# Patient Record
Sex: FEMALE | URBAN - METROPOLITAN AREA
[De-identification: names, ages, dates, MRNs, and addresses within clinical notes are randomized per-mention and may not be internally consistent; named-entity substitution may affect disease eponyms.]

---

## 2020-09-17 ENCOUNTER — OFFICE VISIT (OUTPATIENT)
Dept: URBAN - METROPOLITAN AREA CLINIC 76 | Facility: CLINIC | Age: 60
End: 2020-09-17
Payer: COMMERCIAL

## 2020-09-17 DIAGNOSIS — H52.4 PRESBYOPIA: Primary | ICD-10-CM

## 2020-09-17 PROCEDURE — 92004 COMPRE OPH EXAM NEW PT 1/>: CPT | Performed by: OPTOMETRIST

## 2020-09-17 ASSESSMENT — INTRAOCULAR PRESSURE
OD: 14
OS: 14

## 2020-09-17 ASSESSMENT — KERATOMETRY
OS: 45.00
OD: 45.25

## 2020-09-17 ASSESSMENT — VISUAL ACUITY
OD: 20/60
OS: 20/30

## 2020-09-17 NOTE — IMPRESSION/PLAN
Impression: Age-related nuclear cataract, bilateral: H25.13. Bilateral. Plan: Cataracts account for the patient's complaints. Patient understands changing glasses will not improve vision. Recommend josafat padilla/ Dr. Hema Morton for cataract surgery.

## 2020-10-27 ENCOUNTER — PRE-OPERATIVE VISIT (OUTPATIENT)
Dept: URBAN - METROPOLITAN AREA CLINIC 76 | Facility: CLINIC | Age: 60
End: 2020-10-27
Payer: MEDICARE

## 2020-10-27 DIAGNOSIS — H25.13 AGE-RELATED NUCLEAR CATARACT, BILATERAL: Primary | ICD-10-CM

## 2020-10-27 PROCEDURE — 92136 OPHTHALMIC BIOMETRY: CPT | Performed by: OPHTHALMOLOGY

## 2020-10-27 RX ORDER — DICLOFENAC SODIUM 1 MG/ML
0.1 % SOLUTION/ DROPS OPHTHALMIC
Qty: 2 | Refills: 1 | Status: INACTIVE
Start: 2020-10-27 | End: 2020-10-27

## 2020-10-27 RX ORDER — CIPROFLOXACIN HYDROCHLORIDE 3.5 MG/ML
0.3 % SOLUTION/ DROPS TOPICAL
Qty: 1 | Refills: 1 | Status: INACTIVE
Start: 2020-10-27 | End: 2020-11-18

## 2020-10-27 RX ORDER — PREDNISOLONE ACETATE 10 MG/ML
1 % SUSPENSION/ DROPS OPHTHALMIC
Qty: 1 | Refills: 1 | Status: ACTIVE
Start: 2020-10-27

## 2020-10-27 RX ORDER — DICLOFENAC SODIUM 1 MG/ML
0.1 % SOLUTION/ DROPS OPHTHALMIC
Qty: 1 | Refills: 1 | Status: INACTIVE
Start: 2020-10-27 | End: 2020-12-03

## 2020-10-27 ASSESSMENT — PACHYMETRY
OS: 23.03
OS: 3.10
OD: 3.12
OD: 23.08

## 2020-10-30 ENCOUNTER — OFFICE VISIT (OUTPATIENT)
Dept: URBAN - METROPOLITAN AREA CLINIC 76 | Facility: CLINIC | Age: 60
End: 2020-10-30
Payer: MEDICARE

## 2020-10-30 PROCEDURE — 92002 INTRM OPH EXAM NEW PATIENT: CPT | Performed by: OPHTHALMOLOGY

## 2020-10-30 ASSESSMENT — KERATOMETRY
OD: 45.13
OS: 45.38

## 2020-10-30 ASSESSMENT — INTRAOCULAR PRESSURE
OS: 14
OD: 14

## 2020-10-30 ASSESSMENT — VISUAL ACUITY
OD: 20/60
OS: 20/40

## 2020-10-30 NOTE — IMPRESSION/PLAN
Impression: Age-related nuclear cataract, bilateral: H25.13. Bilateral. Plan: Discussed cataract diagnosis with the patient. Discussed risks, benefits and alternatives to surgery including but not limited to: bleeding, infection, risk of vision loss, loss of the eye, need for other surgery. Patient voiced understanding and wishes to proceed. Patient elects surgical treatment. Advanced technology discussed with patient. Patient desires surgery OU, OD first (( recommend DISTANCE -0.25 OU w/ STANDARD IOL, anesthesia block patient, use drops-start ocuflox 1 day prior to sx)) Patient understands the need for glasses after surgery for BCVA. Risk Level 2.

## 2020-11-05 ENCOUNTER — SURGERY (OUTPATIENT)
Dept: URBAN - METROPOLITAN AREA SURGERY 47 | Facility: SURGERY | Age: 60
End: 2020-11-05
Payer: MEDICARE

## 2020-11-05 PROCEDURE — 66984 XCAPSL CTRC RMVL W/O ECP: CPT | Performed by: OPHTHALMOLOGY

## 2020-11-06 ENCOUNTER — POST-OPERATIVE VISIT (OUTPATIENT)
Dept: URBAN - METROPOLITAN AREA CLINIC 76 | Facility: CLINIC | Age: 60
End: 2020-11-06
Payer: MEDICARE

## 2020-11-06 PROCEDURE — 99024 POSTOP FOLLOW-UP VISIT: CPT | Performed by: OPTOMETRIST

## 2020-11-06 ASSESSMENT — INTRAOCULAR PRESSURE
OD: 14
OS: 13

## 2020-11-06 NOTE — IMPRESSION/PLAN
Impression: S/P CE/Standard IOL OD - 1 Day. Encounter for surgical aftercare following surgery on a sense organ  Z48.810. Excellent post op course   Post operative instructions reviewed - Plan: Continue Prednisolone TID OD Diclofenac TID OD Ciprofloxacin TID x1 week

## 2020-11-12 ENCOUNTER — POST-OPERATIVE VISIT (OUTPATIENT)
Dept: URBAN - METROPOLITAN AREA CLINIC 76 | Facility: CLINIC | Age: 60
End: 2020-11-12
Payer: MEDICARE

## 2020-11-12 DIAGNOSIS — Z48.810 ENCOUNTER FOR SURGICAL AFTERCARE FOLLOWING SURGERY ON A SENSE ORGAN: Primary | ICD-10-CM

## 2020-11-12 PROCEDURE — 99024 POSTOP FOLLOW-UP VISIT: CPT | Performed by: OPTOMETRIST

## 2020-11-12 ASSESSMENT — VISUAL ACUITY
OD: 20/40
OS: 20/25

## 2020-11-12 ASSESSMENT — INTRAOCULAR PRESSURE
OS: 15
OD: 14

## 2020-11-12 NOTE — IMPRESSION/PLAN
Impression: S/P CE/Standard IOL OD - 7 Days. Encounter for surgical aftercare following surgery on a sense organ  Z48.810. Post operative instructions reviewed - Plan: Hold off on schedule CE/IOL OS --Continue and increase Pred Forte QID OD, Diclofenac TID OD. D/C Ciprofloxacin, .--Advised patient to use artificial tears for comfort.

## 2020-11-18 ENCOUNTER — POST-OPERATIVE VISIT (OUTPATIENT)
Dept: URBAN - METROPOLITAN AREA CLINIC 76 | Facility: CLINIC | Age: 60
End: 2020-11-18
Payer: MEDICARE

## 2020-11-18 PROCEDURE — 99024 POSTOP FOLLOW-UP VISIT: CPT | Performed by: OPTOMETRIST

## 2020-11-18 ASSESSMENT — VISUAL ACUITY
OD: 20/30+2
OS: 20/30-1

## 2020-11-18 ASSESSMENT — INTRAOCULAR PRESSURE
OS: 14
OD: 15

## 2020-11-18 NOTE — IMPRESSION/PLAN
Impression: S/P CE/Standard IOL +23.8 OD - 13 Days. Encounter for surgical aftercare following surgery on a sense organ  Z48.810. Excellent post op course   Post operative instructions reviewed - Plan: Discussed with patient. Continue Pred Forte TID OD and Diclofenac TID OD as directed. Continue AT's 3-4 times a day.

## 2020-12-03 ENCOUNTER — POST-OPERATIVE VISIT (OUTPATIENT)
Dept: URBAN - METROPOLITAN AREA CLINIC 76 | Facility: CLINIC | Age: 60
End: 2020-12-03
Payer: MEDICARE

## 2020-12-03 PROCEDURE — 99024 POSTOP FOLLOW-UP VISIT: CPT | Performed by: OPTOMETRIST

## 2020-12-03 ASSESSMENT — INTRAOCULAR PRESSURE
OS: 13
OD: 14

## 2020-12-03 ASSESSMENT — VISUAL ACUITY
OS: 20/30
OD: 20/30+2

## 2020-12-03 NOTE — IMPRESSION/PLAN
Impression: S/P CE/Standard IOL 20.50 OD - 28 Days. Encounter for surgical aftercare following surgery on a sense organ  Z48.810. Excellent post op course Plan: Final Mrx given today. Pt to call with concerns. D/c Diclofenac. Finish Pred Forte as directed. Continue artificial tears.

## 2024-02-20 ENCOUNTER — OFFICE VISIT (OUTPATIENT)
Dept: URBAN - METROPOLITAN AREA CLINIC 76 | Facility: CLINIC | Age: 64
End: 2024-02-20
Payer: MEDICARE

## 2024-02-20 DIAGNOSIS — H25.12 AGE-RELATED NUCLEAR CATARACT, LEFT EYE: Primary | ICD-10-CM

## 2024-02-20 DIAGNOSIS — Z96.1 PRESENCE OF INTRAOCULAR LENS: ICD-10-CM

## 2024-02-20 PROCEDURE — 99204 OFFICE O/P NEW MOD 45 MIN: CPT | Performed by: OPTOMETRIST

## 2024-02-20 ASSESSMENT — KERATOMETRY
OD: 45.13
OS: 44.88

## 2024-02-20 ASSESSMENT — VISUAL ACUITY
OS: 20/40
OD: 20/30

## 2024-02-20 ASSESSMENT — INTRAOCULAR PRESSURE
OS: 11
OD: 13